# Patient Record
Sex: MALE | Race: WHITE
[De-identification: names, ages, dates, MRNs, and addresses within clinical notes are randomized per-mention and may not be internally consistent; named-entity substitution may affect disease eponyms.]

---

## 2018-01-25 ENCOUNTER — HOSPITAL ENCOUNTER (EMERGENCY)
Dept: HOSPITAL 31 - C.ER | Age: 73
Discharge: HOME | End: 2018-01-25
Payer: COMMERCIAL

## 2018-01-25 VITALS — SYSTOLIC BLOOD PRESSURE: 152 MMHG | RESPIRATION RATE: 18 BRPM | DIASTOLIC BLOOD PRESSURE: 82 MMHG | HEART RATE: 61 BPM

## 2018-01-25 VITALS — OXYGEN SATURATION: 99 % | TEMPERATURE: 97.6 F

## 2018-01-25 VITALS — BODY MASS INDEX: 26.6 KG/M2

## 2018-01-25 DIAGNOSIS — E11.9: ICD-10-CM

## 2018-01-25 DIAGNOSIS — R10.11: Primary | ICD-10-CM

## 2018-01-25 LAB
ALBUMIN SERPL-MCNC: 4 G/DL (ref 3.5–5)
ALBUMIN/GLOB SERPL: 1.3 {RATIO} (ref 1–2.1)
ALT SERPL-CCNC: 20 U/L (ref 21–72)
APTT BLD: 27 SECONDS (ref 21–34)
AST SERPL-CCNC: 16 U/L (ref 17–59)
BASOPHILS # BLD AUTO: 0 K/UL (ref 0–0.2)
BASOPHILS NFR BLD: 0.8 % (ref 0–2)
BILIRUB UR-MCNC: NEGATIVE MG/DL
BUN SERPL-MCNC: 17 MG/DL (ref 9–20)
CALCIUM SERPL-MCNC: 9.2 MG/DL (ref 8.6–10.4)
EOSINOPHIL # BLD AUTO: 0.1 K/UL (ref 0–0.7)
EOSINOPHIL NFR BLD: 2 % (ref 0–4)
ERYTHROCYTE [DISTWIDTH] IN BLOOD BY AUTOMATED COUNT: 12.4 % (ref 11.5–14.5)
GFR NON-AFRICAN AMERICAN: > 60
GLUCOSE UR STRIP-MCNC: NORMAL MG/DL
HGB BLD-MCNC: 13.1 G/DL (ref 12–18)
INR PPP: 1.1
LEUKOCYTE ESTERASE UR-ACNC: (no result) LEU/UL
LIPASE: 119 U/L (ref 23–300)
LYMPHOCYTES # BLD AUTO: 1.7 K/UL (ref 1–4.3)
LYMPHOCYTES NFR BLD AUTO: 28.1 % (ref 20–40)
MCH RBC QN AUTO: 34.2 PG (ref 27–31)
MCHC RBC AUTO-ENTMCNC: 34 G/DL (ref 33–37)
MCV RBC AUTO: 100.6 FL (ref 80–94)
MONOCYTES # BLD: 0.4 K/UL (ref 0–0.8)
MONOCYTES NFR BLD: 6.9 % (ref 0–10)
NEUTROPHILS # BLD: 3.8 K/UL (ref 1.8–7)
NEUTROPHILS NFR BLD AUTO: 62.2 % (ref 50–75)
NRBC BLD AUTO-RTO: 0.1 % (ref 0–2)
PH UR STRIP: 5 [PH] (ref 5–8)
PLATELET # BLD: 196 K/UL (ref 130–400)
PMV BLD AUTO: 9.1 FL (ref 7.2–11.7)
PROT UR STRIP-MCNC: NEGATIVE MG/DL
PROTHROMBIN TIME: 12.2 SECONDS (ref 9.7–12.2)
RBC # BLD AUTO: 3.83 MIL/UL (ref 4.4–5.9)
RBC # UR STRIP: NEGATIVE /UL
SP GR UR STRIP: 1.01 (ref 1–1.03)
URINE NITRATE: NEGATIVE
UROBILINOGEN UR-MCNC: NORMAL MG/DL (ref 0.2–1)
WBC # BLD AUTO: 6 K/UL (ref 4.8–10.8)

## 2018-01-25 PROCEDURE — 85025 COMPLETE CBC W/AUTO DIFF WBC: CPT

## 2018-01-25 PROCEDURE — 85610 PROTHROMBIN TIME: CPT

## 2018-01-25 PROCEDURE — 80053 COMPREHEN METABOLIC PANEL: CPT

## 2018-01-25 PROCEDURE — 81001 URINALYSIS AUTO W/SCOPE: CPT

## 2018-01-25 PROCEDURE — 99285 EMERGENCY DEPT VISIT HI MDM: CPT

## 2018-01-25 PROCEDURE — 96374 THER/PROPH/DIAG INJ IV PUSH: CPT

## 2018-01-25 PROCEDURE — 96375 TX/PRO/DX INJ NEW DRUG ADDON: CPT

## 2018-01-25 PROCEDURE — 76705 ECHO EXAM OF ABDOMEN: CPT

## 2018-01-25 PROCEDURE — 83690 ASSAY OF LIPASE: CPT

## 2018-01-25 PROCEDURE — 85730 THROMBOPLASTIN TIME PARTIAL: CPT

## 2018-01-25 PROCEDURE — 84484 ASSAY OF TROPONIN QUANT: CPT

## 2018-01-25 PROCEDURE — 71046 X-RAY EXAM CHEST 2 VIEWS: CPT

## 2018-01-25 PROCEDURE — 96361 HYDRATE IV INFUSION ADD-ON: CPT

## 2018-01-25 PROCEDURE — 93005 ELECTROCARDIOGRAM TRACING: CPT

## 2018-01-25 NOTE — RAD
HISTORY:

abd pain  



COMPARISON:

No prior.



TECHNIQUE:

Chest PA and lateral



FINDINGS:



LUNGS:

No active pulmonary disease.



PLEURA:

No significant pleural effusion identified. No pneumothorax apparent.



CARDIOVASCULAR:

Normal. Tortuous thoracic aorta 



OSSEOUS STRUCTURES:

No significant abnormalities.



VISUALIZED UPPER ABDOMEN:

Normal.



OTHER FINDINGS:

None.



IMPRESSION:

No active disease.

## 2018-01-25 NOTE — C.PDOC
History Of Present Illness


72 yo male come in for evaluation of cold sx associated with malaise, dry cough 

for past 2 weeks. Pt reports, developed RUQ pain for past few days. Otherwise, 

pt denies high fever, chills, headache, dizziness, drooling, dysphagia, dyspnea

, CP, SOB, palpitation, N/V/D, change in appetite, back pain, UTI sx. Ambulate 

to Ed for evaluation, not in any apparent distress.


Time Seen by Provider: 01/25/18 09:08


Chief Complaint (Nursing): Abdominal Pain


History Per: Patient





Past Medical History


Reviewed: Historical Data, Nursing Documentation, Vital Signs


Vital Signs: 


 Last Vital Signs











Temp  97.6 F   01/25/18 09:01


 


Pulse  55 L  01/25/18 09:01


 


Resp  20   01/25/18 09:01


 


BP  153/91 H  01/25/18 09:01


 


Pulse Ox  99   01/25/18 10:54














- Medical History


PMH: Diabetes (type 2)


Family History: States: Unknown Family Hx





- Social History


Hx Tobacco Use: No


Hx Alcohol Use: No


Hx Substance Use: No





Review Of Systems


Except As Marked, All Systems Reviewed And Found Negative.


Constitutional: Positive for: Malaise.  Negative for: Fever, Chills


ENT: Negative for: Ear Discharge, Nose Discharge, Mouth Swelling, Throat Pain, 

Throat Swelling


Cardiovascular: Negative for: Chest Pain, Palpitations, Edema, Light Headedness


Respiratory: Positive for: Cough.  Negative for: Shortness of Breath, Sputum, 

Wheezing


Gastrointestinal: Positive for: Abdominal Pain.  Negative for: Nausea, Vomiting

, Diarrhea, Melena, Hematochezia, Hematemesis


Genitourinary: Negative for: Dysuria, Incontinence


Musculoskeletal: Negative for: Neck Pain, Back Pain


Skin: Negative for: Rash


Neurological: Negative for: Weakness, Numbness, Altered Mental Status, Headache

, Dizziness





Physical Exam





- Physical Exam


Appears: Well, Non-toxic, No Acute Distress


Skin: Normal Color, Warm, Dry, No Rash


Eye(s): bilateral: PERRL


Nose: No Flaring, No Discharge


Oral Mucosa: Moist


Tongue: Normal Appearing


Lips: Normal Appearing


Throat: No Erythema, No Drooling


Neck: Trachea Midline, Supple


Cardiovascular: Rhythm Regular, No Murmur, No JVD, Other ((-) carotid bruits B/L

)


Respiratory: No Decreased Breath Sounds, No Accessory Muscle Use, No Stridor, 

No Wheezing


Gastrointestinal/Abdominal: Soft, Tenderness (mild RUQ tenderness), No 

Distention, No Guarding, No Rebound


Back: No CVA Tenderness


Extremity: Normal ROM, No Pedal Edema, No Deformity


Neurological/Psych: Oriented x3, Normal Speech





ED Course And Treatment





- Laboratory Results


Result Diagrams: 


 01/25/18 10:13





 01/25/18 10:13


Lab Interpretation: No Acute Changes


ECG: Interpreted By Me, Viewed By Me (and ED attending)


ECG Rhythm: Sinus Bradycardia


Interpretation Of ECG: Sinus bhupinder@49/min, NAD, T wave inversion in III, no 

acute ST-T changes.


O2 Sat by Pulse Oximetry: 99


Pulse Ox Interpretation: Normal





- Other Rad


  ** CXR


X-Ray: Read By Radiologist


Interpretation: FINDINGS:  LUNGS:  No active pulmonary disease.  PLEURA:  No 

significant pleural effusion identified. No pneumothorax apparent.  

CARDIOVASCULAR:  Normal. Tortuous thoracic aorta.  OSSEOUS STRUCTURES:  No 

significant abnormalities.  VISUALIZED UPPER ABDOMEN:  Normal.  OTHER FINDINGS:

  None.  IMPRESSION:  No active disease.





- CT Scan/US


  ** Gallbladder US


Other Rad Studies (CT/US): Radiology Report Reviewed


CT/US Interpretation: Right upper quadrant abdominal ultrasound.  History: 

Right upper quadrant abdominal pain.  Comparison: None available.  Technique: 

Real-time sonography was performed through the right upper quadrant of the 

abdomen.  Findings:  Liver: 13.7 centimeters in length. Increased echogenicity 

of the hepatic parenchymal cortex suggestive for fatty infiltration versus 

hepatic parenchymal disease. Clinical correlation.  Gallbladder: 

Cholelithiasis. Normal wall thickness of 2.4 millimeters.  Negative sonographic 

Schmid's sign.  Common bile duct measures 5.8 millimeters, within normal 

limits.  Visualized portions of the pancreas are preserved.  Pancreatic tail 

not well visualized.  Visualized aorta and IVC are preserved. Mild prominence 

of the proximal aorta measures up to 2.6 centimeters demonstrating some 

calcification and plaque.  Right kidney: 9.3 x 4.4 x 4.8 centimeters. Midpole 9 

millimeter echogenic foci suggestive for nonobstructive calculus. No 

hydronephrosis.  Impression:  Cholelithiasis. No gross gallbladder wall 

thickening. Negative sonographic Schmid's sign.  Increased echogenicity of the 

hepatic parenchymal cortex suggestive for fatty infiltration versus hepatic 

parenchymal disease.  Clinical correlation.  Limited visualization of the 

pancreas.


Progress Note: Pt was OBS in ED for 3.5 hours.  On re-eval, pt reports moderate 

improvement in his symptoms.  Afebrile, hemdoynamicalys table.  Non-toxic.  Pt 

was given PO challenfe, tolerated well.  Pt denies CP, SOB, headache, dizziness

, palpitation. PuslEOx 99% RA.  ENT: no acute findings.  neck: SUpple, (-) JVD, 

(-) carotid bruits B/L.  Lungs: CTA B/L, BS equal B/L.  CVS: (+)S1S2, reg, (-) 

murmur.  Abd: benign, (-) guarding, (-) rebound.  Back: (-) CVA tenderness.  

Neurologicaly intact.  Blood work review and appears without acute 

abnormalities.  CXR, EKG- normal study. US review and no emergent findings 

noted. Case discussed with , all test review and discharge with outpt f/

u recommend. Pt has clinical findings c/w abdominal pain, cough r/o viral 

illness.  Pt advised.  ref. to f/u with PMD in 2-3 days for re-eavl.  return to 

ED if any worsening or new changes.





Disposition


Counseled Patient/Family Regarding: Studies Performed, Diagnosis, Need For 

Followup, Rx Given





- Disposition


Disposition: HOME/ ROUTINE


Disposition Time: 12:01


Condition: STABLE


Additional Instructions: 


Follow up with PMD in 2-3 days for re-evaluation.


return to Ed if any worsening or new changes.


Instructions:  Abdominal Pain (ED)


Forms:  [facility] (English)


Print Language: Malaysian





- Clinical Impression


Clinical Impression: 


 Abdominal pain

## 2018-01-25 NOTE — US
Right upper quadrant abdominal ultrasound 



History: Right upper quadrant abdominal pain. 



Comparison: None available. 



Technique: Real-time sonography was performed through the right upper 

quadrant of the abdomen. 



Findings: 



Liver: 13.7 centimeters in length. Increased echogenicity of the 

hepatic parenchymal cortex suggestive for fatty infiltration versus 

hepatic parenchymal disease. Clinical correlation. 



Gallbladder: Cholelithiasis. Normal wall thickness of 2.4 

millimeters.  Negative sonographic Schmid's sign. 



Common bile duct measures 5.8 millimeters, within normal limits. 



Visualized portions of the pancreas are preserved.  Pancreatic tail 

not well visualized. 



Visualized aorta and IVC are preserved. Mild prominence of the 

proximal aorta measures up to 2.6 centimeters demonstrating some 

calcification and plaque. 



Right kidney: 9.3 x 4.4 x 4.8 centimeters. Midpole 9 millimeter 

echogenic foci suggestive for nonobstructive calculus. No 

hydronephrosis. 



Impression: 



Cholelithiasis. No gross gallbladder wall thickening. Negative 

sonographic Schmid's sign. 



Increased echogenicity of the hepatic parenchymal cortex suggestive 

for fatty infiltration versus hepatic parenchymal disease.  Clinical 

correlation. 



Limited visualization of the pancreas.

## 2018-01-25 NOTE — CARD
--------------- APPROVED REPORT --------------





EKG Measurement

Heart [id number]

ID [id number]

[id number]

[id number]

[id number]



<Conclusion>

Sinus bradycardia

Otherwise normal ECG

## 2019-04-16 ENCOUNTER — HOSPITAL ENCOUNTER (OUTPATIENT)
Dept: HOSPITAL 31 - C.ER | Age: 74
Setting detail: OBSERVATION
LOS: 3 days | Discharge: HOME | End: 2019-04-19
Payer: COMMERCIAL

## 2019-04-16 VITALS — BODY MASS INDEX: 31.2 KG/M2

## 2019-04-16 DIAGNOSIS — Z79.4: ICD-10-CM

## 2019-04-16 DIAGNOSIS — E11.65: ICD-10-CM

## 2019-04-16 DIAGNOSIS — E87.6: ICD-10-CM

## 2019-04-16 DIAGNOSIS — K21.9: ICD-10-CM

## 2019-04-16 DIAGNOSIS — K29.70: ICD-10-CM

## 2019-04-16 DIAGNOSIS — R07.89: Primary | ICD-10-CM

## 2019-04-16 LAB
ALBUMIN SERPL-MCNC: 4.7 G/DL (ref 3.5–5)
ALBUMIN/GLOB SERPL: 1.8 {RATIO} (ref 1–2.1)
ALT SERPL-CCNC: 10 U/L (ref 21–72)
AST SERPL-CCNC: 14 U/L (ref 17–59)
BASOPHILS # BLD AUTO: 0 K/UL (ref 0–0.2)
BASOPHILS NFR BLD: 0.5 % (ref 0–2)
BUN SERPL-MCNC: 19 MG/DL (ref 9–20)
CALCIUM SERPL-MCNC: 10.1 MG/DL (ref 8.6–10.4)
CK MB SERPL-MCNC: < 0.22 NG/ML (ref 0–3.38)
CK MB SERPL-MCNC: < 0.22 NG/ML (ref 0–3.38)
EOSINOPHIL # BLD AUTO: 0 K/UL (ref 0–0.7)
EOSINOPHIL NFR BLD: 0.3 % (ref 0–4)
ERYTHROCYTE [DISTWIDTH] IN BLOOD BY AUTOMATED COUNT: 12.2 % (ref 11.5–14.5)
GFR NON-AFRICAN AMERICAN: > 60
HGB BLD-MCNC: 14.4 G/DL (ref 12–18)
LIPASE: 148 U/L (ref 23–300)
LYMPHOCYTES # BLD AUTO: 1.3 K/UL (ref 1–4.3)
LYMPHOCYTES NFR BLD AUTO: 19.1 % (ref 20–40)
MCH RBC QN AUTO: 34.6 PG (ref 27–31)
MCHC RBC AUTO-ENTMCNC: 34.3 G/DL (ref 33–37)
MCV RBC AUTO: 101 FL (ref 80–94)
MONOCYTES # BLD: 0.3 K/UL (ref 0–0.8)
MONOCYTES NFR BLD: 5 % (ref 0–10)
NEUTROPHILS # BLD: 5.1 K/UL (ref 1.8–7)
NEUTROPHILS NFR BLD AUTO: 75.1 % (ref 50–75)
NRBC BLD AUTO-RTO: 0 % (ref 0–2)
PLATELET # BLD: 229 K/UL (ref 130–400)
PMV BLD AUTO: 9.4 FL (ref 7.2–11.7)
RBC # BLD AUTO: 4.14 MIL/UL (ref 4.4–5.9)
WBC # BLD AUTO: 6.7 K/UL (ref 4.8–10.8)

## 2019-04-16 PROCEDURE — 84484 ASSAY OF TROPONIN QUANT: CPT

## 2019-04-16 PROCEDURE — 82948 REAGENT STRIP/BLOOD GLUCOSE: CPT

## 2019-04-16 PROCEDURE — 83690 ASSAY OF LIPASE: CPT

## 2019-04-16 PROCEDURE — 85027 COMPLETE CBC AUTOMATED: CPT

## 2019-04-16 PROCEDURE — 80061 LIPID PANEL: CPT

## 2019-04-16 PROCEDURE — 80053 COMPREHEN METABOLIC PANEL: CPT

## 2019-04-16 PROCEDURE — 99285 EMERGENCY DEPT VISIT HI MDM: CPT

## 2019-04-16 PROCEDURE — 83036 HEMOGLOBIN GLYCOSYLATED A1C: CPT

## 2019-04-16 PROCEDURE — 96374 THER/PROPH/DIAG INJ IV PUSH: CPT

## 2019-04-16 PROCEDURE — 84443 ASSAY THYROID STIM HORMONE: CPT

## 2019-04-16 PROCEDURE — 83540 ASSAY OF IRON: CPT

## 2019-04-16 PROCEDURE — 83550 IRON BINDING TEST: CPT

## 2019-04-16 PROCEDURE — 97162 PT EVAL MOD COMPLEX 30 MIN: CPT

## 2019-04-16 PROCEDURE — 93005 ELECTROCARDIOGRAM TRACING: CPT

## 2019-04-16 PROCEDURE — 83880 ASSAY OF NATRIURETIC PEPTIDE: CPT

## 2019-04-16 PROCEDURE — 82746 ASSAY OF FOLIC ACID SERUM: CPT

## 2019-04-16 PROCEDURE — 97116 GAIT TRAINING THERAPY: CPT

## 2019-04-16 PROCEDURE — 71046 X-RAY EXAM CHEST 2 VIEWS: CPT

## 2019-04-16 PROCEDURE — 93306 TTE W/DOPPLER COMPLETE: CPT

## 2019-04-16 PROCEDURE — 36415 COLL VENOUS BLD VENIPUNCTURE: CPT

## 2019-04-16 PROCEDURE — 82607 VITAMIN B-12: CPT

## 2019-04-16 PROCEDURE — 97530 THERAPEUTIC ACTIVITIES: CPT

## 2019-04-16 PROCEDURE — 85025 COMPLETE CBC W/AUTO DIFF WBC: CPT

## 2019-04-16 PROCEDURE — 80048 BASIC METABOLIC PNL TOTAL CA: CPT

## 2019-04-16 NOTE — C.PDOC
History Of Present Illness


74 year old male brought in via transportation with complaint of new onset 

epigastric pain that began last night. Patient describes the pain as constant, 

localized, and waxing and waning. Patient's pain is not associated with eating 

or exertion. Patient has a history of occasional cough for 4 days. Patient 

denies history of CAD and GI disease. Patient has no PSHx. He denies nausea, 

vomiting, diarrhea, and SOB. 








VIA TRANS





NEW ONSET EPIG PAIN SINCE LAST NIGHT. CONSTANT LOCALIZED WAX/WANE NO ASSOC W 

EATING, EXERTION. DENIES NVD, SOB. HO OCC COUGH X 4 DAYS. NO FEVER. DENIES HO 

CAD, GI DISEASE. PSH NEG





EXAM


NONTOXIC


ANICTERIC MMM


ABD SOFT NT ND NO R/G


CTA B/L NO W/R/R


REMAINDER NEG


Time Seen by Provider: 19 11:47


Chief Complaint (Nursing): Chest Pain


History Per: Patient


History/Exam Limitations: no limitations


Onset/Duration Of Symptoms: Hrs (12), Waxing/Waning, Sudden Onset


Current Symptoms Are (Timing): Still Present


Quality: "Pain"


Associated Symptoms: denies: Nausea, Dyspnea


Modifying Factors: None


Exacerbating Factors: None


Alleviating Factors: None





Past Medical History


Reviewed: Historical Data, Nursing Documentation, Vital Signs


Vital Signs: 





                                Last Vital Signs











Temp  98.2 F   19 11:19


 


Pulse  62   19 11:19


 


Resp  20   19 11:19


 


BP  164/90 H  19 11:19


 


Pulse Ox  98   19 11:19














- Medical History


PMH: Diabetes (type 2)


Surgical History: No Surg Hx


Family History: States: Unknown Family Hx





- Social History


Hx Tobacco Use: No


Hx Alcohol Use: No


Hx Substance Use: No





Review Of Systems


Constitutional: Negative for: Fever, Chills


Cardiovascular: Negative for: Chest Pain


Respiratory: Positive for: Cough.  Negative for: Shortness of Breath


Gastrointestinal: Positive for: Abdominal Pain (epigastric pain).  Negative for:

Nausea, Vomiting, Diarrhea





Physical Exam





- Physical Exam


Appears: Well, Non-toxic, No Acute Distress


Skin: Normal Color, Warm, Dry


Head: Atraumatic, Normacephalic


Eye(s): bilateral: Normal Inspection (anicteric)


Oral Mucosa: Moist


Neck: Normal ROM, Supple


Chest: Symmetrical, No Deformity, No Tenderness


Cardiovascular: Rhythm Regular, No Murmur


Respiratory: No Accessory Muscle Use, No Rales, No Rhonchi, No Wheezing, Other 

(NARD)


Gastrointestinal/Abdominal: Soft, No Tenderness, No Distention, No Guarding, No 

Rebound


Extremity: Capillary Refill (<2 seconds)


Pulses: Left Radial: Normal, Right Radial: Normal


Neurological/Psych: Oriented x3, Normal Speech, Normal Cognition





ED Course And Treatment





- Laboratory Results


Result Diagrams: 


                                 19 11:49





                                 19 11:49


ECG: Interpreted By Me


ECG Rhythm: Sinus Rhythm


ECG Interpretation: Normal


Rate From EC


O2 Sat by Pulse Oximetry: 98 (in RA)


Pulse Ox Interpretation: Normal





- Radiology


CXR: Interpreted by Me


CXR Interpretation: Yes: No Acute Disease


Progress Note: EKG and CXR ordered for patient.  Labs ordered with CMP, CBC, 

lipase.





Progress





- Re-Evaluation


Re-evaluation Note: 





19 12:27


APPEARS COMFORTABLE VSS. PENDING CALLBACK MED ON CALL


19 12:48


D/W DR HUYNH MED ON CALL WILL ADMIT





- Data Reviewed


Data Reviewed: Lab, Diagnostic imaging, EKG, Old records





Disposition


Counseled Patient/Family Regarding: Studies Performed, Diagnosis





- Disposition


Disposition: HOSPITALIZED


Disposition Time: 12:49


Condition: STABLE


Forms:  [facility] (English)





- POA


Present On Arrival: None, Poor Glycemic Control





- Clinical Impression


Clinical Impression: 


 Chest pain








- Scribe Statement


The provider has reviewed the documentation as recorded by the Scribe (Ericka Leon)








All medical record entries made by the Scribe were at my direction and 

personally dictated by me. I have reviewed the chart and agree that the record 

accurately reflects my personal performance of the history, physical exam, 

medical decision making, and the department course for this patient. I have also

personally directed, reviewed, and agree with the discharge instructions and 

disposition.

## 2019-04-16 NOTE — RAD
Chest x-ray two views 



HISTORY:

Chest pain. 



COMPARISON:

01/25/2018 



FINDINGS:

Blunted left costophrenic angle which may represent pleural 

thickening and or trace effusion. 



Mild patchy increased markings at the left lung base. 



Tortuous aorta. 



Heart size within normal limits. 



Degenerative changes in the spine. 



Impression: 



Blunted left costophrenic angle which may represent pleural 

thickening and or trace effusion. 



Mild patchy increased markings at the left lung base. 



Tortuous aorta.

## 2019-04-17 LAB
ALBUMIN SERPL-MCNC: 3.9 G/DL (ref 3.5–5)
ALBUMIN/GLOB SERPL: 1.6 {RATIO} (ref 1–2.1)
ALT SERPL-CCNC: 16 U/L (ref 21–72)
AST SERPL-CCNC: 13 U/L (ref 17–59)
BASOPHILS # BLD AUTO: 0 K/UL (ref 0–0.2)
BASOPHILS NFR BLD: 0.6 % (ref 0–2)
BUN SERPL-MCNC: 17 MG/DL (ref 9–20)
CALCIUM SERPL-MCNC: 9.4 MG/DL (ref 8.6–10.4)
CK MB SERPL-MCNC: < 0.22 NG/ML (ref 0–3.38)
CK MB SERPL-MCNC: < 0.22 NG/ML (ref 0–3.38)
EOSINOPHIL # BLD AUTO: 0.1 K/UL (ref 0–0.7)
EOSINOPHIL NFR BLD: 1 % (ref 0–4)
ERYTHROCYTE [DISTWIDTH] IN BLOOD BY AUTOMATED COUNT: 12.1 % (ref 11.5–14.5)
GFR NON-AFRICAN AMERICAN: > 60
HGB BLD-MCNC: 13.5 G/DL (ref 12–18)
LYMPHOCYTES # BLD AUTO: 1.4 K/UL (ref 1–4.3)
LYMPHOCYTES NFR BLD AUTO: 21.8 % (ref 20–40)
MCH RBC QN AUTO: 33.2 PG (ref 27–31)
MCHC RBC AUTO-ENTMCNC: 33.4 G/DL (ref 33–37)
MCV RBC AUTO: 99.6 FL (ref 80–94)
MONOCYTES # BLD: 0.5 K/UL (ref 0–0.8)
MONOCYTES NFR BLD: 7 % (ref 0–10)
NEUTROPHILS # BLD: 4.5 K/UL (ref 1.8–7)
NEUTROPHILS NFR BLD AUTO: 69.6 % (ref 50–75)
NRBC BLD AUTO-RTO: 0 % (ref 0–2)
PLATELET # BLD: 205 K/UL (ref 130–400)
PMV BLD AUTO: 9.3 FL (ref 7.2–11.7)
RBC # BLD AUTO: 4.06 MIL/UL (ref 4.4–5.9)
WBC # BLD AUTO: 6.5 K/UL (ref 4.8–10.8)

## 2019-04-17 RX ADMIN — ENOXAPARIN SODIUM SCH MG: 40 INJECTION SUBCUTANEOUS at 11:15

## 2019-04-17 RX ADMIN — HUMAN INSULIN SCH: 100 INJECTION, SOLUTION SUBCUTANEOUS at 23:21

## 2019-04-17 RX ADMIN — HUMAN INSULIN SCH UNITS: 100 INJECTION, SOLUTION SUBCUTANEOUS at 17:29

## 2019-04-18 VITALS — RESPIRATION RATE: 20 BRPM

## 2019-04-18 LAB
% IRON SATURATION: 44 (ref 20–55)
BNP SERPL-MCNC: 39.6 PG/ML (ref 0–900)
BUN SERPL-MCNC: 19 MG/DL (ref 9–20)
CALCIUM SERPL-MCNC: 9.1 MG/DL (ref 8.6–10.4)
ERYTHROCYTE [DISTWIDTH] IN BLOOD BY AUTOMATED COUNT: 12 % (ref 11.5–14.5)
FOLATE SERPL-MCNC: 15.6 NG/ML
GFR NON-AFRICAN AMERICAN: > 60
HDLC SERPL-MCNC: 42 MG/DL (ref 30–70)
HGB BLD-MCNC: 13.7 G/DL (ref 12–18)
IRON SERPL-MCNC: 132 UG/DL (ref 49–181)
LDLC SERPL-MCNC: 93 MG/DL (ref 0–129)
MCH RBC QN AUTO: 33.7 PG (ref 27–31)
MCHC RBC AUTO-ENTMCNC: 33.5 G/DL (ref 33–37)
MCV RBC AUTO: 100.6 FL (ref 80–94)
PLATELET # BLD: 220 K/UL (ref 130–400)
PMV BLD AUTO: 9.3 FL (ref 7.2–11.7)
RBC # BLD AUTO: 4.08 MIL/UL (ref 4.4–5.9)
TIBC SERPL-MCNC: 302 UG/DL (ref 250–450)
VIT B12 SERPL-MCNC: 241 PG/ML (ref 239–931)
WBC # BLD AUTO: 7.4 K/UL (ref 4.8–10.8)

## 2019-04-18 RX ADMIN — HUMAN INSULIN SCH UNITS: 100 INJECTION, SOLUTION SUBCUTANEOUS at 12:00

## 2019-04-18 RX ADMIN — ENOXAPARIN SODIUM SCH MG: 40 INJECTION SUBCUTANEOUS at 09:06

## 2019-04-18 RX ADMIN — HUMAN INSULIN SCH UNITS: 100 INJECTION, SOLUTION SUBCUTANEOUS at 17:45

## 2019-04-18 RX ADMIN — HUMAN INSULIN SCH: 100 INJECTION, SOLUTION SUBCUTANEOUS at 21:40

## 2019-04-18 RX ADMIN — HUMAN INSULIN SCH: 100 INJECTION, SOLUTION SUBCUTANEOUS at 08:49

## 2019-04-18 NOTE — CARD
--------------- APPROVED REPORT --------------





Date of service: 04/17/2019



EXAM: Two-dimensional and M-mode echocardiogram with Doppler and 

color Doppler.



Other Information 

Quality : GoodRhythm : 



INDICATION

Chest Pain 



RISK FACTORS

Diabetes



2D DIMENSIONS 

IVSd1.1   (0.7-1.1cm)LVDd4.2   (3.9-5.9cm)

LVOT Diameter2.3   (1.8-2.4cm)PWd1.1   (0.7-1.1cm)

LA Bpxkgd67   (18-58mL)LVDs2.3   (2.5-4.0cm)

FS (%) 45.3   %LVEF (%)75.0   (>50%)

LVEF (Paez's)70 %



M-Mode DIMENSIONS 

Left Atrium (MM)2.52   (2.5-4.0cm)IVSd1.22   (0.7-1.1cm)

Aortic Root4.01   (2.2-3.7cm)LVDd5.51   (4.0-5.6cm)

Aortic Cusp Exc.2.13   (1.5-2.0cm)PWd1.22   (0.7-1.1cm)

FS (%) 34   %LVDs3.61   (2.0-3.8cm)

LVEF (%)65   (>50%)



Aortic Valve

AoV Peak Glgqwgan042.5cm/sAoV VTI47.2cmAO Peak GR.25mmHg

LVOT Peak Ipajjkzw97.3cm/sLVOT VTI20.23cmAO Mean GR.14mmHg

JULES (VMAX)1.03my8EHR (VTI)1.30ia9QA P 1/2 Gudr332dy



Mitral Valve

MV E Yiovgtax45.8cm/sMV A Jkdxxwrq50.0cm/sE/A ratio0.6



TDI

Lateral E' Peak V7.06cm/sMedial E' Peak V4.99cm/sE/Lateral E'8.2

E/Medial E'11.6



Tricuspid Valve

TR Peak Xbtcixbf434iy/sTR Peak Gr.47pnMoMXMN34pmNq



<Conclusion>

Left ventricle: thickness: normal; size: normal; overall ejection 

fraction: 65%: 

diastolic filling pressures: normal



Mitral valve: annulus: normal: leaflets: normal: excursion: normal; 

no significant trans-mitral gradient: No significant incompetence: 

left atrium: normal

Aortic valve: leaflets:thickened;  excursion: normal; 28/16mmHg 

trans-aortic gradient:trace  incompetence: aortic root: dilated; AV 

area 1.6cm2

Right sided Structures: Pulmonary valve: normal; no significant 

incompetence; Tricuspid valve: normal; no significant incompetence:

Intra-cardiac hemodynamics: pulmonary systolic pressures: normal; 

central venous pressures: normal

No pericardial effusion

## 2019-04-18 NOTE — HP
The patient is a 74-year-old male.  The patient was seen and examined at

the bedside on 04/17/2019.



CHIEF COMPLAINT:  Chest pain.



HISTORY OF PRESENT ILLNESS:  Mr. Reji Love is a 74-year-old male

brought via transportation with complaining of new onset of chest pain,

epigastric pain that began last night.  The patient described the pain as

constant, localized, waxing and waning.  The patient's pain is not

associated with eating or exertion.  The patient has history of occasional

cough for two days.  The patient denies history of coronary artery disease

and GI.  The patient has no past medical history.  Denies nausea, vomiting,

diarrhea, or shortness of breath.



PAST MEDICAL HISTORY:  Denies except for diabetes mellitus.



FAMILY HISTORY:  Father and mother, noncontributory.



HABITS:  Never smoked.  No drugs.  No ethanol.



REVIEW OF SYSTEMS:  The patient was seen and examined at the bedside,

looking comfortable.  No fever.  No chills.  No hematuria.  No

hematochezia.  No headache.  No dizziness.  No chest pain.  No palpitation

PHYSICAL EXAMINATION:

VITAL SIGNS:  Temperature 98.3, pulse 62, respiratory rate 20, blood

pressure 164/90, and pulse oximetry 98.

HEENT:  Head; atraumatic and normocephalic.  Eyes; PERRLA, extraocular

muscles intact, conjunctivae clear.  Nose is patent.  Mucous membrane

moist.

NECK:  Supple.  No carotid bruit.  No JVD or thyromegaly.

CHEST:  Bilaterally symmetrical.

HEART:  S1 and S2 positive.

LUNGS:  Clear to auscultation.

ABDOMEN:  Soft.  Bowel sounds present.  No organomegaly.

EXTREMITIES:  No edema.  No cyanosis.

NEUROLOGICAL:  The patient is awake and alert.  Moving all four

extremities.  No focal deficits.



LABORATORY DATA:  White blood cell 6.7, hemoglobin 14.4, hematocrit 41.9,

and platelets 229.  Sodium 133, potassium 4.1, BUN 19, creatinine 1.1,

glucose 225.



ASSESSMENT AND PLAN:  Mr. Reji Love is a 74-year-old male with

hypokalemia, hyperglycemia, history of diabetes mellitus who came with

chest pain.  We tested the patient with electrocardiography, cardiac

enzymes x2 less than 0.0120, diabetes uncontrolled, hemoglobin A1c is 9.2. 

Need diabetic teaching classes with Cardiology consult.  Repeat labs. 

Observe the patient.  We will follow.





__________________________________________

Ondina Jaramillo MD





__________________________________________

 (Delete this signature block when dictator is a preceptor.)



cc:  Name, MD (Delete if not dictated.)



DD:  04/17/2019 23:03:15

DT:  04/18/2019 4:05:06

Job # [id number]

MTDELEANOR

## 2019-04-18 NOTE — CARD
--------------- APPROVED REPORT --------------





Date of service: 04/16/2019



EKG Measurement

Heart [id number]

ME [id number]

[id number]

[id number]

[id number]



<Conclusion>

Normal sinus rhythm

Normal ECG

## 2019-04-18 NOTE — CARD
--------------- APPROVED REPORT --------------





Date of service: 04/16/2019



EKG Measurement

Heart [id number]

AK [id number]

[id number]

[id number]

[id number]



<Conclusion>

Normal sinus rhythm

Normal ECG

## 2019-04-19 VITALS — DIASTOLIC BLOOD PRESSURE: 76 MMHG | SYSTOLIC BLOOD PRESSURE: 116 MMHG | HEART RATE: 74 BPM

## 2019-04-19 VITALS — TEMPERATURE: 97.9 F | OXYGEN SATURATION: 96 %

## 2019-04-19 LAB
ALBUMIN SERPL-MCNC: 3.9 G/DL (ref 3.5–5)
ALBUMIN/GLOB SERPL: 1.6 {RATIO} (ref 1–2.1)
ALT SERPL-CCNC: 19 U/L (ref 21–72)
AST SERPL-CCNC: 21 U/L (ref 17–59)
BASOPHILS # BLD AUTO: 0 K/UL (ref 0–0.2)
BASOPHILS NFR BLD: 0.6 % (ref 0–2)
BUN SERPL-MCNC: 18 MG/DL (ref 9–20)
CALCIUM SERPL-MCNC: 8.8 MG/DL (ref 8.6–10.4)
EOSINOPHIL # BLD AUTO: 0.1 K/UL (ref 0–0.7)
EOSINOPHIL NFR BLD: 2.1 % (ref 0–4)
ERYTHROCYTE [DISTWIDTH] IN BLOOD BY AUTOMATED COUNT: 11.9 % (ref 11.5–14.5)
GFR NON-AFRICAN AMERICAN: > 60
HGB BLD-MCNC: 13.7 G/DL (ref 12–18)
LYMPHOCYTES # BLD AUTO: 1.6 K/UL (ref 1–4.3)
LYMPHOCYTES NFR BLD AUTO: 26.1 % (ref 20–40)
MCH RBC QN AUTO: 34.2 PG (ref 27–31)
MCHC RBC AUTO-ENTMCNC: 34.2 G/DL (ref 33–37)
MCV RBC AUTO: 99.9 FL (ref 80–94)
MONOCYTES # BLD: 0.4 K/UL (ref 0–0.8)
MONOCYTES NFR BLD: 7.2 % (ref 0–10)
NEUTROPHILS # BLD: 3.9 K/UL (ref 1.8–7)
NEUTROPHILS NFR BLD AUTO: 64 % (ref 50–75)
NRBC BLD AUTO-RTO: 0 % (ref 0–2)
PLATELET # BLD: 224 K/UL (ref 130–400)
PMV BLD AUTO: 9.3 FL (ref 7.2–11.7)
RBC # BLD AUTO: 4.01 MIL/UL (ref 4.4–5.9)
WBC # BLD AUTO: 6.1 K/UL (ref 4.8–10.8)

## 2019-04-19 RX ADMIN — HUMAN INSULIN SCH UNITS: 100 INJECTION, SOLUTION SUBCUTANEOUS at 08:45

## 2019-04-19 RX ADMIN — ENOXAPARIN SODIUM SCH MG: 40 INJECTION SUBCUTANEOUS at 10:18

## 2019-04-19 RX ADMIN — HUMAN INSULIN SCH UNITS: 100 INJECTION, SOLUTION SUBCUTANEOUS at 13:31

## 2019-04-19 NOTE — CP.PCM.CON
History of Present Illness





- History of Present Illness


History of Present Illness: 





74 year old male brought in via transportation with complaint of new onset 

epigastric pain that began last night. Patient describes the pain as constant, 

localized, and waxing and waning. Patient's pain is not associated with eating 

or exertion. Patient has a history of occasional cough for 4 days. Patient 

denies history of CAD and GI disease. Patient has history of diabetes and has 

been taking Metformin for few years. History was obtained with the help of 

.








Review of Systems





- Constitutional


Constitutional: As Per HPI





- EENT


Eyes: As Per HPI, Decreased Night Vision





- Cardiovascular


Cardiovascular: As Per HPI





- Gastrointestinal


Gastrointestinal: Abdominal Pain





- Neurological


Neurological: As Per HPI





Past Patient History





- Past Medical History & Family History


Past Medical History?: Yes





- Past Social History


Smoking Status: Never Smoked





- CARDIAC


Hx Cardiac Disorders: No





- PULMONARY


Hx Respiratory Disorders: No





- NEUROLOGICAL


Hx Dementia: No





- ENDOCRINE/METABOLIC


Hx Diabetes Mellitus Type 1: Yes


Hx Diabetes Mellitus Type 2: Yes





- PSYCHIATRIC


Hx Substance Use: No





- SURGICAL HISTORY


Hx Surgeries: No





- ANESTHESIA


Hx Anesthesia: No





Meds


Allergies/Adverse Reactions: 


                                    Allergies











Allergy/AdvReac Type Severity Reaction Status Date / Time


 


No Known Allergies Allergy   Verified 01/25/18 09:00














- Medications


Medications: 


                               Current Medications





Aspirin (Aspirin Chewable)  81 mg PO DAILY [facility]


   Last Admin: 04/19/19 10:17 Dose:  81 mg


Enoxaparin Sodium (Lovenox)  40 mg SC DAILY [facility]


   Last Admin: 04/19/19 10:18 Dose:  40 mg


Famotidine (Pepcid)  20 mg PO DAILY [facility]


   Last Admin: 04/19/19 10:17 Dose:  20 mg


Insulin Human Regular (Novolin R)  0 unit SC [facility]; Protocol


   Last Admin: 04/19/19 08:45 Dose:  1 units


Metformin HCl (Glucophage)  850 mg PO BID [facility]


   Last Admin: 04/19/19 10:17 Dose:  850 mg











Physical Exam





- Head Exam


Head Exam: NORMOCEPHALIC





- Neck Exam


Neck exam: Positive for: Normal Inspection





- Respiratory Exam


Respiratory Exam: NORMAL BREATHING PATTERN





- Cardiovascular Exam


Cardiovascular Exam: REGULAR RHYTHM





- GI/Abdominal Exam


GI & Abdominal Exam: Normal Bowel Sounds





- Extremities Exam


Extremities exam: Positive for: normal inspection





- Neurological Exam


Neurological exam: Altered, Oriented x3





Results





- Vital Signs


Recent Vital Signs: 


                                Last Vital Signs











Temp  97.9 F   04/19/19 07:05


 


Pulse  62   04/19/19 07:05


 


Resp  20   04/19/19 07:05


 


BP  120/81   04/19/19 07:05


 


Pulse Ox  96   04/19/19 07:05














- Labs


Result Diagrams: 


                                 04/19/19 08:19





                                 04/19/19 08:19


Labs: 


                         Laboratory Results - last 24 hr











  04/18/19 04/18/19 04/19/19





  16:24 20:59 06:25


 


WBC   


 


RBC   


 


Hgb   


 


Hct   


 


MCV   


 


MCH   


 


MCHC   


 


RDW   


 


Plt Count   


 


MPV   


 


Neut % (Auto)   


 


Lymph % (Auto)   


 


Mono % (Auto)   


 


Eos % (Auto)   


 


Baso % (Auto)   


 


Neut # (Auto)   


 


Lymph # (Auto)   


 


Mono # (Auto)   


 


Eos # (Auto)   


 


Baso # (Auto)   


 


Sodium   


 


Potassium   


 


Chloride   


 


Carbon Dioxide   


 


Anion Gap   


 


BUN   


 


Creatinine   


 


Est GFR ( Amer)   


 


Est GFR (Non-Af Amer)   


 


POC Glucose (mg/dL)  187 H  144 H  195 H


 


Random Glucose   


 


Calcium   


 


Total Bilirubin   


 


AST   


 


ALT   


 


Alkaline Phosphatase   


 


Total Protein   


 


Albumin   


 


Globulin   


 


Albumin/Globulin Ratio   














  04/19/19 04/19/19 04/19/19





  08:19 08:19 11:08


 


WBC  6.1  


 


RBC  4.01 L  


 


Hgb  13.7  


 


Hct  40.1  


 


MCV  99.9 H  


 


MCH  34.2 H  


 


MCHC  34.2  


 


RDW  11.9  


 


Plt Count  224  


 


MPV  9.3  


 


Neut % (Auto)  64.0  


 


Lymph % (Auto)  26.1  


 


Mono % (Auto)  7.2  


 


Eos % (Auto)  2.1  


 


Baso % (Auto)  0.6  


 


Neut # (Auto)  3.9  


 


Lymph # (Auto)  1.6  


 


Mono # (Auto)  0.4  


 


Eos # (Auto)  0.1  


 


Baso # (Auto)  0.0  


 


Sodium   131 L 


 


Potassium   4.2 


 


Chloride   97 L 


 


Carbon Dioxide   27 


 


Anion Gap   11 


 


BUN   18 


 


Creatinine   1.0 


 


Est GFR ( Amer)   > 60 


 


Est GFR (Non-Af Amer)   > 60 


 


POC Glucose (mg/dL)    256 H


 


Random Glucose   174 H 


 


Calcium   8.8 


 


Total Bilirubin   0.8 


 


AST   21 


 


ALT   19 L 


 


Alkaline Phosphatase   60 


 


Total Protein   6.3 


 


Albumin   3.9 


 


Globulin   2.4 


 


Albumin/Globulin Ratio   1.6 














- Impressions


Impression: 





Sinus rhythm with non-specific ST-T changes.





Assessment & Plan


(1) Chest pain


Assessment and Plan: 


Chest pain is atypical and has relived with Antacids. Cardiac work-up has been 

negative so far. However there are multiple risks of CAD and further work-up is 

needed. This can be done as out patient. Discussed with patient with the help of

. Patient understands and will follow as out patient. Case discussed 

with team. May D/C home of there are no new issues.


Status: Acute   





(2) Abdominal pain


Assessment and Plan: 


Most likely Gastritis, Continue Pepcid. 


Status: Acute

## 2019-04-19 NOTE — PN
DATE:  04/18/2019



SUBJECTIVE:  The patient is a 74-year-old male.  The patient was seen and

examined at the bedside on 04/18/2019, looking comfortable.  No fever.  No

chills.  No hematuria.  No hematochezia.  No headache.  No dizziness.  No

chest pain.  No palpitation.



PHYSICAL EXAMINATION:

VITAL SIGNS:  Temperature 98.3, pulse of 72, blood pressure 123/83, and

respiratory rate 20.

HEENT:  Head:  Normocephalic and atraumatic.  Eyes:  PERRLA.  Extraocular

muscles are intact.  Conjunctivae are clear.  Nose is patent.  Mucous

membranes are moist.

NECK:  Supple.  No carotid bruits.  No JVD or thyromegaly.

CHEST:  Bilaterally symmetrical.

HEART:  S1 and S2 positive.

LUNGS:  Clear to auscultation.

ABDOMEN:  Soft.  Bowel sounds present.  No organomegaly.

EXTREMITIES:  No edema.  No cyanosis.

NEUROLOGIC:  The patient awake and alert.  Moving all four extremities.  No

focal deficits.



MEDICATIONS:  Aspirin, metformin, Lovenox, Novolin, Pepcid, Protonix.



LABORATORY DATA:  White blood cell 7.4, hemoglobin 13.7, hematocrit 41.1,

and platelets 220.  Sodium 135, potassium 4, BUN 90, creatinine 0.9,

glucose 254.



ASSESSMENT AND PLAN:  Mr. Reji Love is a 74-year-old male with

hyperglycemia, uncontrolled diabetes mellitus, hemoglobin A1c is 9.3, B12

deficiency, who got hyperthyroidism, came with chest pain, history of

diabetes mellitus, gastroesophageal reflux disease, and dyspepsia.  The

patient has an echocardiography done.  Cardiac enzymes are negative.  Needs

diabetic teaching.  Awaiting input from the cardiologist.  The patient's

cardiologist is Dr. Shari Zamora.  Repeat labs.  We will follow up.







__________________________________________

Ondina Jaramillo MD





DD:  04/18/2019 23:32:34

DT:  04/19/2019 1:53:13

Job # [id number]

## 2019-04-19 NOTE — CARD
--------------- APPROVED REPORT --------------





Date of service: 04/17/2019



EKG Measurement

Heart [id number]

AR [id number]

[id number]

[id number]

[id number]



<Conclusion>

Normal sinus rhythm

Normal ECG

## 2019-04-22 ENCOUNTER — HOSPITAL ENCOUNTER (OUTPATIENT)
Dept: HOSPITAL 31 - C.ER | Age: 74
Setting detail: OBSERVATION
LOS: 1 days | Discharge: HOME | End: 2019-04-23
Attending: INTERNAL MEDICINE | Admitting: INTERNAL MEDICINE
Payer: COMMERCIAL

## 2019-04-22 VITALS — BODY MASS INDEX: 31.2 KG/M2

## 2019-04-22 VITALS — TEMPERATURE: 97.9 F

## 2019-04-22 DIAGNOSIS — E11.9: ICD-10-CM

## 2019-04-22 DIAGNOSIS — R10.13: Primary | ICD-10-CM

## 2019-04-22 DIAGNOSIS — Z79.84: ICD-10-CM

## 2019-04-22 DIAGNOSIS — K59.00: ICD-10-CM

## 2019-04-22 DIAGNOSIS — K57.90: ICD-10-CM

## 2019-04-22 DIAGNOSIS — N20.0: ICD-10-CM

## 2019-04-22 DIAGNOSIS — R11.10: ICD-10-CM

## 2019-04-22 LAB
ALBUMIN SERPL-MCNC: 3.9 G/DL (ref 3.5–5)
ALBUMIN/GLOB SERPL: 1.6 {RATIO} (ref 1–2.1)
ALT SERPL-CCNC: 27 U/L (ref 21–72)
APTT BLD: 33 SECONDS (ref 21–34)
AST SERPL-CCNC: 27 U/L (ref 17–59)
BASE EXCESS BLDV CALC-SCNC: 3.4 MMOL/L (ref 0–2)
BASOPHILS # BLD AUTO: 0 K/UL (ref 0–0.2)
BASOPHILS NFR BLD: 0.5 % (ref 0–2)
BILIRUB UR-MCNC: NEGATIVE MG/DL
BUN SERPL-MCNC: 10 MG/DL (ref 9–20)
CALCIUM SERPL-MCNC: 9.3 MG/DL (ref 8.6–10.4)
CK MB SERPL-MCNC: < 0.22 NG/ML (ref 0–3.38)
EOSINOPHIL # BLD AUTO: 0.1 K/UL (ref 0–0.7)
EOSINOPHIL NFR BLD: 1.1 % (ref 0–4)
ERYTHROCYTE [DISTWIDTH] IN BLOOD BY AUTOMATED COUNT: 12.3 % (ref 11.5–14.5)
GFR NON-AFRICAN AMERICAN: > 60
GLUCOSE UR STRIP-MCNC: (no result) MG/DL
HDLC SERPL-MCNC: 44 MG/DL (ref 30–70)
HGB BLD-MCNC: 13.3 G/DL (ref 12–18)
INR PPP: 1.1
LDLC SERPL-MCNC: 85 MG/DL (ref 0–129)
LEUKOCYTE ESTERASE UR-ACNC: (no result) LEU/UL
LIPASE: 125 U/L (ref 23–300)
LYMPHOCYTES # BLD AUTO: 1.7 K/UL (ref 1–4.3)
LYMPHOCYTES NFR BLD AUTO: 25.4 % (ref 20–40)
MCH RBC QN AUTO: 33.7 PG (ref 27–31)
MCHC RBC AUTO-ENTMCNC: 33.4 G/DL (ref 33–37)
MCV RBC AUTO: 100.8 FL (ref 80–94)
MONOCYTES # BLD: 0.5 K/UL (ref 0–0.8)
MONOCYTES NFR BLD: 6.7 % (ref 0–10)
NEUTROPHILS # BLD: 4.5 K/UL (ref 1.8–7)
NEUTROPHILS NFR BLD AUTO: 66.3 % (ref 50–75)
NRBC BLD AUTO-RTO: 0 % (ref 0–2)
PCO2 BLDV: 50 MMHG (ref 40–60)
PH BLDV: 7.38 [PH] (ref 7.32–7.43)
PH UR STRIP: 6 [PH] (ref 5–8)
PLATELET # BLD: 231 K/UL (ref 130–400)
PMV BLD AUTO: 8.9 FL (ref 7.2–11.7)
PROT UR STRIP-MCNC: NEGATIVE MG/DL
PROTHROMBIN TIME: 12.4 SECONDS (ref 9.7–12.2)
RBC # BLD AUTO: 3.94 MIL/UL (ref 4.4–5.9)
RBC # UR STRIP: NEGATIVE /UL
SP GR UR STRIP: 1.02 (ref 1–1.03)
UROBILINOGEN UR-MCNC: NORMAL MG/DL (ref 0.2–1)
VENOUS BLOOD GAS PO2: 30 MM/HG (ref 30–55)
WBC # BLD AUTO: 6.8 K/UL (ref 4.8–10.8)

## 2019-04-22 PROCEDURE — 90732 PPSV23 VACC 2 YRS+ SUBQ/IM: CPT

## 2019-04-22 PROCEDURE — 83690 ASSAY OF LIPASE: CPT

## 2019-04-22 PROCEDURE — 81001 URINALYSIS AUTO W/SCOPE: CPT

## 2019-04-22 PROCEDURE — 82803 BLOOD GASES ANY COMBINATION: CPT

## 2019-04-22 PROCEDURE — 82948 REAGENT STRIP/BLOOD GLUCOSE: CPT

## 2019-04-22 PROCEDURE — 84100 ASSAY OF PHOSPHORUS: CPT

## 2019-04-22 PROCEDURE — 85730 THROMBOPLASTIN TIME PARTIAL: CPT

## 2019-04-22 PROCEDURE — 85025 COMPLETE CBC W/AUTO DIFF WBC: CPT

## 2019-04-22 PROCEDURE — 96374 THER/PROPH/DIAG INJ IV PUSH: CPT

## 2019-04-22 PROCEDURE — 74177 CT ABD & PELVIS W/CONTRAST: CPT

## 2019-04-22 PROCEDURE — 80061 LIPID PANEL: CPT

## 2019-04-22 PROCEDURE — 85610 PROTHROMBIN TIME: CPT

## 2019-04-22 PROCEDURE — 83735 ASSAY OF MAGNESIUM: CPT

## 2019-04-22 PROCEDURE — 84484 ASSAY OF TROPONIN QUANT: CPT

## 2019-04-22 PROCEDURE — 36415 COLL VENOUS BLD VENIPUNCTURE: CPT

## 2019-04-22 PROCEDURE — 90471 IMMUNIZATION ADMIN: CPT

## 2019-04-22 PROCEDURE — 76705 ECHO EXAM OF ABDOMEN: CPT

## 2019-04-22 PROCEDURE — 83036 HEMOGLOBIN GLYCOSYLATED A1C: CPT

## 2019-04-22 PROCEDURE — 99285 EMERGENCY DEPT VISIT HI MDM: CPT

## 2019-04-22 PROCEDURE — 96360 HYDRATION IV INFUSION INIT: CPT

## 2019-04-22 PROCEDURE — 80053 COMPREHEN METABOLIC PANEL: CPT

## 2019-04-22 RX ADMIN — HUMAN INSULIN SCH: 100 INJECTION, SOLUTION SUBCUTANEOUS at 21:15

## 2019-04-22 NOTE — CP.PCM.HP
History of Present Illness





- History of Present Illness


History of Present Illness: 





74 year old Brian Green party male with history of type 2 diabetes presents 

to the ED today complaining of epigastric abdominal pain. Patient reports his 

abdominal pain started about 10 days ago. He describes the pain as burning in 

sensation, non radiating, and rating it 7/10 at the worst. He states the pain is

worse with food intake and improves with rest. He also reports to have 

associated nausea and vomiting. No prior history of the same. Patient was 

diagnosed with diabetes 10 years ago in  and was told to take Meformin 850mg 

twice daily. Patient denies recent change of diet, fever, chills, headache, 

shortness of breath, chest pain, diarrhea, constipation, or urinary complaints.





 ID: [id number]





Daughter Titi: [phone]


PMHx: DM


PSHx: none


Allergy: none


social Hx: denies alcohol, tobacco, or other drug use


Family Hx: Mother with DM


Medication: Metformin 850mg BID from 





Present on Admission





- Present on Admission


Any Indicators Present on Admission: No





Review of Systems





- Constitutional


Constitutional: As Per HPI.  absent: Chills, Fever, Headache





- EENT


Eyes: As Per HPI.  absent: Blind Spots, Decreased Night Vision, Diplopia


Ears: As Per HPI.  absent: Ear Pain, Dizziness


Nose/Mouth/Throat: As Per HPI.  absent: Epistaxis, Nasal Obstruction, Nasal 

Trauma





- Cardiovascular


Cardiovascular: As Per HPI.  absent: Acrocyanosis, Chest Pain, Chest Pain at 

Rest, Claudication, Dyspnea on Exertion





- Respiratory


Respiratory: As Per HPI.  absent: Cough, Dyspnea, Hemoptysis, Wheezing





- Gastrointestinal


Gastrointestinal: As Per HPI, Abdominal Pain, Heartburn, Nausea, Vomiting.  

absent: Change in Stool Character, Constipation, Diarrhea, Hematemesis, Hematoch

ezia





- Genitourinary


Genitourinary: As Per HPI.  absent: Dysuria, Urinary Frequency, Freq UTI





- Reproductive: Male


Reproductive:Male: As Per HPI





- Musculoskeletal


Musculoskeletal: As Per HPI.  absent: Joint Swelling, Muscle Weakness, Neck 

Pain, Numbness





- Integumentary


Integumentary: As Per HPI.  absent: Acne, Alopecia, Dry Skin, Furuncle





- Neurological


Neurological: As Per HPI.  absent: Behavioral Changes, Dizziness, Numbness, 

Tingling, Tremor, Vertigo





- Psychiatric


Psychiatric: As Per HPI.  absent: Anxiety, Depression





- Endocrine


Endocrine: As Per HPI





- Hematologic/Lymphatic


Hematologic: As Per HPI





Past Patient History





- Past Medical History & Family History


Past Medical History?: Yes





- Past Social History


Smoking Status: Never Smoked





- CARDIAC


Hx Cardiac Disorders: No





- PULMONARY


Hx Respiratory Disorders: No





- NEUROLOGICAL


Hx Dementia: No





- ENDOCRINE/METABOLIC


Hx Endocrine Disorders: Yes


Hx Diabetes Mellitus Type 2: Yes





- PSYCHIATRIC


Hx Substance Use: No





- SURGICAL HISTORY


Hx Surgeries: No





- ANESTHESIA


Hx Anesthesia: No





Meds


Allergies/Adverse Reactions: 


                                    Allergies











Allergy/AdvReac Type Severity Reaction Status Date / Time


 


No Known Allergies Allergy   Verified 04/22/19 07:35














Physical Exam





- Constitutional


Appears: Well, No Acute Distress





- Head Exam


Head Exam: ATRAUMATIC, NORMOCEPHALIC





- Eye Exam


Eye Exam: EOMI, Normal appearance, PERRL


Pupil Exam: NORMAL ACCOMODATION, PERRL





- ENT Exam


ENT Exam: Mucous Membranes Moist, Normal Exam





- Neck Exam


Neck exam: Positive for: Normal Inspection





- Respiratory Exam


Respiratory Exam: Clear to Auscultation Bilateral, NORMAL BREATHING PATTERN.  

absent: Rhonchi, Wheezes, Respiratory Distress





- Cardiovascular Exam


Cardiovascular Exam: REGULAR RHYTHM, +S1, +S2





- GI/Abdominal Exam


GI & Abdominal Exam: Normal Bowel Sounds, Soft, Tenderness (epigatric tenderness

with deep palpation).  absent: Guarding, Hernia, Rigid


Additional comments: 





Negative Schmid's sign, Negative McBurney's





- Extremities Exam


Extremities exam: Positive for: normal inspection





- Neurological Exam


Neurological exam: Alert, CN II-XII Intact, Oriented x3





- Psychiatric Exam


Psychiatric exam: Normal Affect, Normal Mood





Results





- Vital Signs


Recent Vital Signs: 





                                Last Vital Signs











Temp  97.8 F   04/22/19 17:08


 


Pulse  68   04/22/19 17:08


 


Resp  18   04/22/19 17:08


 


BP  151/92 H  04/22/19 17:08


 


Pulse Ox  96   04/22/19 18:36














- Labs


Result Diagrams: 


                                 04/22/19 08:26





                                 04/22/19 08:26


Labs: 





                         Laboratory Results - last 24 hr











  04/22/19 04/22/19 04/22/19





  08:26 08:26 08:26


 


WBC  6.8  


 


RBC  3.94 L  


 


Hgb  13.3  


 


Hct  39.7  


 


MCV  100.8 H  


 


MCH  33.7 H  


 


MCHC  33.4  


 


RDW  12.3  


 


Plt Count  231  


 


MPV  8.9  


 


Neut % (Auto)  66.3  


 


Lymph % (Auto)  25.4  


 


Mono % (Auto)  6.7  


 


Eos % (Auto)  1.1  


 


Baso % (Auto)  0.5  


 


Neut # (Auto)  4.5  


 


Lymph # (Auto)  1.7  


 


Mono # (Auto)  0.5  


 


Eos # (Auto)  0.1  


 


Baso # (Auto)  0.0  


 


PT   12.4 H 


 


INR   1.1 


 


APTT   33 


 


pO2   


 


VBG pH   


 


VBG pCO2   


 


VBG HCO3   


 


VBG Total CO2   


 


VBG O2 Sat (Calc)   


 


VBG Base Excess   


 


VBG Potassium   


 


Glucose   


 


Lactate   


 


Sodium    133


 


Potassium    4.5


 


Chloride    99


 


Carbon Dioxide    28


 


Anion Gap    11


 


BUN    10


 


Creatinine    1.1


 


Est GFR ( Amer)    > 60


 


Est GFR (Non-Af Amer)    > 60


 


Random Glucose    208 H


 


Calcium    9.3


 


Total Bilirubin    0.6


 


AST    27


 


ALT    27


 


Alkaline Phosphatase    68


 


Total Protein    6.4


 


Albumin    3.9


 


Globulin    2.5


 


Albumin/Globulin Ratio    1.6


 


Lipase    125


 


Venous Blood Potassium   


 


Urine Color   


 


Urine Clarity   


 


Urine pH   


 


Ur Specific Gravity   


 


Urine Protein   


 


Urine Glucose (UA)   


 


Urine Ketones   


 


Urine Blood   


 


Urine Nitrate   


 


Urine Bilirubin   


 


Urine Urobilinogen   


 


Ur Leukocyte Esterase   


 


Urine WBC (Auto)   


 


Urine RBC (Auto)   














  04/22/19 04/22/19





  08:34 08:43


 


WBC  


 


RBC  


 


Hgb  


 


Hct  


 


MCV  


 


MCH  


 


MCHC  


 


RDW  


 


Plt Count  


 


MPV  


 


Neut % (Auto)  


 


Lymph % (Auto)  


 


Mono % (Auto)  


 


Eos % (Auto)  


 


Baso % (Auto)  


 


Neut # (Auto)  


 


Lymph # (Auto)  


 


Mono # (Auto)  


 


Eos # (Auto)  


 


Baso # (Auto)  


 


PT  


 


INR  


 


APTT  


 


pO2  30 


 


VBG pH  7.38 


 


VBG pCO2  50 


 


VBG HCO3  26.4 


 


VBG Total CO2  31.1 H 


 


VBG O2 Sat (Calc)  57.1 


 


VBG Base Excess  3.4 H 


 


VBG Potassium  4.1 


 


Glucose  192 H 


 


Lactate  0.9 


 


Sodium  134.0 


 


Potassium  


 


Chloride  100.0 


 


Carbon Dioxide  


 


Anion Gap  


 


BUN  


 


Creatinine  


 


Est GFR ( Amer)  


 


Est GFR (Non-Af Amer)  


 


Random Glucose  


 


Calcium  


 


Total Bilirubin  


 


AST  


 


ALT  


 


Alkaline Phosphatase  


 


Total Protein  


 


Albumin  


 


Globulin  


 


Albumin/Globulin Ratio  


 


Lipase  


 


Venous Blood Potassium  4.1 


 


Urine Color   Yellow


 


Urine Clarity   Clear


 


Urine pH   6.0


 


Ur Specific Gravity   1.016


 


Urine Protein   Negative


 


Urine Glucose (UA)   1+ H


 


Urine Ketones   Negative


 


Urine Blood   Negative


 


Urine Nitrate   Negative


 


Urine Bilirubin   Negative


 


Urine Urobilinogen   Normal


 


Ur Leukocyte Esterase   Neg


 


Urine WBC (Auto)   2


 


Urine RBC (Auto)   < 1














Assessment & Plan





- Assessment and Plan (Free Text)


Assessment: 





Epigastric abdominal pain


-Abdominal ultrasound shows cholelithiasis, fatty liver.


-Abdominal CT shows: Hypoattenuation of the liver compatible with hepatic 

steatosis. Enlarged heterogeneous prostate gland. Recommend correlation with 

PSA. Diverticulosis without CT evidence of acute diverticulitis. Moderate 

diffuse constipation. 7 mm nonobstructing right lower pole renal calculus. 

Bilateral renal cortical scarring/lobulations


-Full liquid diet, advance as tolerated


-Follow up ELMA and EKG


-IVF NS 100ml/hr


-Protonix 40mg


-Zofran 4mg IV PRN





Diabetes


-ISS


-Hypoglycemic protocol


-CCD


-Accucheck ACHS


-Metformin 850mg BID


-F/u A1C





Prophylactic measures


-Protonix


-Lovenox





Case discussed with Dr. Garcia

## 2019-04-22 NOTE — C.PDOC
History Of Present Illness


75 y/o male presents to the ED for 3 week history of upper abdominal pain. 

Patient was recently admitted from 4/16-4/19. States he came in for the 

abdominal pain, had a cardiac work-up which was negative, and he was discharged 

home. He reports pain is worsening, associated with nausea and vomiting. Patient

had multiple episodes of vomiting last night. No diarrhea. Pain was initially 

mild, gradual onset 3 weeks ago, and progressively worsening since. Otherwise 

patient denies any chest pain, SOB, fever, chills, headaches, dizziness, or 

urinary complaints.





Time Seen by Provider: 04/22/19 07:41


Chief Complaint (Nursing): Abdominal Pain


History Per: Patient


History/Exam Limitations: no limitations


Onset/Duration Of Symptoms: Days


Current Symptoms Are (Timing): Still Present


Location Of Pain/Discomfort: RUQ


Associated Symptoms: Nausea, Vomiting





Past Medical History


Reviewed: Historical Data, Nursing Documentation, Vital Signs


Vital Signs: 





                                Last Vital Signs











Temp  98.8 F   04/22/19 07:33


 


Pulse  70   04/22/19 07:33


 


Resp  17   04/22/19 07:33


 


BP  144/91 H  04/22/19 07:33


 


Pulse Ox  96   04/22/19 07:33














- Medical History


PMH: Diabetes (type 2)


   Denies: Dementia


Family History: States: Unknown Family Hx





- Social History


Hx Tobacco Use: No


Hx Alcohol Use: No


Hx Substance Use: No





- Immunization History


Hx Tetanus Toxoid Vaccination: Yes


Hx Influenza Vaccination: No


Hx Pneumococcal Vaccination: No





Review Of Systems


Constitutional: Negative for: Fever, Chills


Cardiovascular: Negative for: Chest Pain, Palpitations


Respiratory: Negative for: Shortness of Breath


Gastrointestinal: Positive for: Nausea, Vomiting, Abdominal Pain.  Negative for:

Diarrhea, Constipation


Genitourinary: Negative for: Dysuria, Hematuria


Neurological: Negative for: Weakness, Dizziness





Physical Exam





- Physical Exam


Appears: Non-toxic, No Acute Distress, Other (Appears uncomfortable)


Skin: Warm, Dry, No Rash


Head: Atraumatic, Normacephalic


Eye(s): bilateral: Normal Inspection, PERRL, EOMI


Oral Mucosa: Moist


Neck: Normal ROM


Chest: Symmetrical


Cardiovascular: Rhythm Regular, No Murmur


Respiratory: Normal Breath Sounds, No Accessory Muscle Use


Gastrointestinal/Abdominal: Soft, Tenderness (to the RUQ), Guarding (RUQ), No 

Rebound


Extremity: Bilateral: Atraumatic, Normal Color And Temperature


Neurological/Psych: Oriented x3, Normal Speech





ED Course And Treatment





- Laboratory Results


Result Diagrams: 


                                 04/22/19 08:26





                                 04/22/19 08:26


Lab Results: 





                                        











pO2  30 mm/Hg (30-55)   04/22/19  08:34    


 


VBG pH  7.38  (7.32-7.43)   04/22/19  08:34    


 


VBG pCO2  50 mmHg (40-60)   04/22/19  08:34    


 


VBG HCO3  26.4 mmol/L  04/22/19  08:34    


 


VBG Total CO2  31.1 mmol/L (22-28)  H  04/22/19  08:34    


 


VBG O2 Sat (Calc)  57.1 % (40-65)   04/22/19  08:34    


 


VBG Base Excess  3.4 mmol/L (0.0-2.0)  H  04/22/19  08:34    


 


VBG Potassium  4.1 mmol/L (3.6-5.2)   04/22/19  08:34    


 


Sodium  134.0 mmol/l (132-148)   04/22/19  08:34    


 


Chloride  100.0 mmol/L ()   04/22/19  08:34    


 


Glucose  192 mg/dl ()  H  04/22/19  08:34    


 


Lactate  0.9 mmol/L (0.7-2.1)   04/22/19  08:34    








                                        











PT  12.4 SECONDS (9.7-12.2)  H  04/22/19  08:26    


 


INR  1.1   04/22/19  08:26    


 


APTT  33 SECONDS (21-34)   04/22/19  08:26    











O2 Sat by Pulse Oximetry: 96 (on RA)


Pulse Ox Interpretation: Normal





- CT Scan/US


  ** Abdominal US


Other Rad Studies (CT/US): Read By Radiologist, Radiology Report Reviewed


CT/US Interpretation: Accession No. : [id number].  Patient Name / ID : 

OMAIRA MCINTYRE  / [id number].  Exam Date : 04/22/2019 11:07:19 ( Approved ).  

Study Comment :  Sex / Age : M  / 74Y.  Creator : Thao Rivera MD.  

Dictator : Thao Rivera MD.   :  Approver : Thao Rivera MD.  Approver2 :  Report Date : 04/22/2019 11:32:22.  My Comment :  

*******************************************************************************

****.  Date of service:  04/22/2019.  HISTORY:  Upper abdominal pain.  

COMPARISON:  01/25/2018.  TECHNIQUE:  Sonographic evaluation of the right upper 

quadrant of the abdomen.  FINDINGS:  LIVER:  Measures 15.1 cm in length. There 

is diffuse increased echogenicity of the liver parenchyma.  No mass. No 

intrahepatic bile duct dilatation.  GALLBLADDER:  From multiple prior scratched 

fluid fibroid is distended without wall thickening or pericholecystic fluid.  

The sonographic Schmid's sign is.  COMMON BILE DUCT:  Measures 7.0 mm.  No 

stones. No dilatation.  PANCREAS:  Unremarkable as visualized. No mass. No 

ductal dilatation.  RIGHT KIDNEY:  Measures 9.8 cm in length. Normal 

echogenicity.  No calculus, mass, or hydronephrosis.  AORTA:  No aneurysmal 

dilatation.  IVC:  Unremarkable.  OTHER FINDINGS:  None .  IMPRESSION:  

Cholelithiasis.  Fatty liver.





  ** CT Abdomen/Pelvis


Other Rad Studies (CT/US): Read By Radiologist, Radiology Report Reviewed


CT/US Interpretation: Accession No. : [id number].  Patient Name / ID : 

OMAIRA MCINTYRE  / [id number].  Exam Date : 04/22/2019 14:29:17 ( Approved ).  

Study Comment :  Sex / Age : M  / 74Y.  Creator : Misti Gutiérrez MD.  

Dictator : Misti Gutiérrez MD.   :  Approver : Misti Gutiérrez MD.  Approver2 :  Report Date : 04/22/2019 15:52:11.  My Comment :  

*****************

******************************************************************.  PROCEDURE: 

CT Abdomen and Pelvis with oral and  IV contrast.  HISTORY:  abdominal pain, 

vomiting.  COMPARISON:  Limited abdominal ultrasound performed 4/22/19.  

TECHNIQUE:  Contiguous axial images of the abdomen and pelvis. Oral and IV 

contrast was administered. Coronal and Sagittal reformats generated and 

reviewed.  Contrast dose: 100 mL Visipaque 320 IV.  Radiation dose:  Total exam 

DLP = 435.71 mGy-cm.  This CT exam was performed using one or more of the 

following dose reduction techniques: Automated exposure control, adjustment of 

the mA and/or kV according to patient size, and/or use of iterative 

reconstruction technique.  FINDINGS:  LOWER THORAX:  No visible consolidation, 

pleural effusion, or pneumothorax.  LIVER:  Hypoattenuation of the liver 

compatible with hepatic steatosis.  GALLBLADDER AND BILE DUCTS:  Unremarkable.  

PANCREAS:  Unremarkable.  SPLEEN:  Unremarkable.  ADRENALS:  Unremarkable.  

KIDNEYS AND URETERS:  The kidneys enhance symmetrically. Bilateral cortical 

scarring/lobulations. No hydronephrosis or obstructing renal calculus. 

Nonobstructing 7 mm right lower pole calculus.  BLADDER:  The urinary bladder 

appears unremarkable.  REPRODUCTIVE:  The prostate gland measures approximately 

4.7 x 5.3 cm.  APPENDIX:  The appendix appears within normal limits of caliber. 

No secondary signs of acute appendicitis.  BOWEL:  The stomach is nondistended. 

The bowel loops appear within normal limits of caliber without evidence of 

intestinal obstruction. Diverticulosis without CT evidence of acute dive

rticulitis. Moderate diffuse constipation.  PERITONEUM:  No significant free 

fluid. No definite free air.  LYMPH NODES:  No bulky lymphadenopathy identified.

 VASCULATURE:  No aortic aneurysm. Minimal scattered atherosclerotic 

calcifications.  BONES:  Degenerative changes of the spine.  OTHER FINDINGS:  

Bilateral gynecomastia.  Small fat containing umbilical hernia.  IMPRESSION:  

Hypoattenuation of the liver compatible with hepatic steatosis.  Enlarged 

heterogeneous prostate gland. Recommend correlation with PSA.  Diverticulosis 

without CT evidence of acute diverticulitis. Moderate diffuse constipation.  7 

mm nonobstructing right lower pole renal calculus. Bilateral renal cortical 

scarring/lobulations.  Additional findings as above.


Progress Note: Blood work and urine sent to the lab for analysis. Patient given 

NS IV fluids and 40 mg IV protonix. Pending abdominal ultrasound [RUQ]. 

Ultrasound reviewed, showing cholelithiasis and fatty liver. Patient continues t

o complain of nausea. Given 4 mg IV zofran. Added on CT Abdomen/Pelvis with 

PO&IV contrast.  4:00pm  CT scan resulted. Imaging findings discussed w/ 

patient.  4:31pm  Paged Dr. Gracia, medicine on-call.





- Physician Consult Information


Time Consulting Physician Contacted: 17:05


Physician Contacted: Myles Garcia Jr.


Outcome Of Conversation: Reviewed case with Dr. Garcia, will admit to his service.

 Medical resident notified.





Disposition





- Disposition


Disposition: HOSPITALIZED


Disposition Time: 17:15


Condition: FAIR





- Clinical Impression


Clinical Impression: 


 Epigastric pain, Vomiting








- PA / NP / Resident Statement


MD/DO has reviewed & agrees with the documentation as recorded.





- Scribe Statement


The provider has reviewed the documentation as recorded by the Scribe


Estrella Eisenberg





All medical record entries made by the Scribe were at my direction and 

personally dictated by me. I have reviewed the chart and agree that the record 

accurately reflects my personal performance of the history, physical exam, 

medical decision making, and the department course for this patient. I have also

 personally directed, reviewed, and agree with the discharge instructions and 

disposition.





Decision To Admit





- Pt Status Changed To:


Hospital Disposition Of: Observation





- .


Bed Request Type: Regular


Admitting Physician: Myles Garcia Jr.


Patient Diagnosis: 


 Epigastric pain, Vomiting

## 2019-04-22 NOTE — US
Date of service: 



04/22/2019



HISTORY:

Upper abdominal pain 



COMPARISON:

01/25/2018.



TECHNIQUE:

Sonographic evaluation of the right upper quadrant of the abdomen.



FINDINGS:



LIVER:

Measures 15.1 cm in length. There is diffuse increased echogenicity 

of the liver parenchyma.  No mass. No intrahepatic bile duct 

dilatation. 



GALLBLADDER:

From multiple prior scratched fluid fibroid is distended without wall 

thickening or pericholecystic fluid.  The sonographic Schmid's sign 

is 



COMMON BILE DUCT:

Measures 7.0 mm.  No stones. No dilatation.



PANCREAS:

Unremarkable as visualized. No mass. No ductal dilatation.



RIGHT KIDNEY:

Measures 9.8 cm in length. Normal echogenicity.  No calculus, mass, 

or hydronephrosis.



AORTA:

No aneurysmal dilatation.



IVC:

Unremarkable.



OTHER FINDINGS:

None .



IMPRESSION:

Cholelithiasis.



Fatty liver.

## 2019-04-22 NOTE — CT
PROCEDURE:  CT Abdomen and Pelvis with oral and  IV contrast.



HISTORY:

abdominal pain, vomiting



COMPARISON:

Limited abdominal ultrasound performed 4/22/19



TECHNIQUE:

Contiguous axial images of the abdomen and pelvis. Oral and IV 

contrast was administered. Coronal and Sagittal reformats generated 

and reviewed. 



Contrast dose: 100 mL Visipaque 320 IV



Radiation dose:



Total exam DLP = 435.71 mGy-cm.



This CT exam was performed using one or more of the following dose 

reduction techniques: Automated exposure control, adjustment of the 

mA and/or kV according to patient size, and/or use of iterative 

reconstruction technique.



FINDINGS:





LOWER THORAX:

No visible consolidation, pleural effusion, or pneumothorax.



LIVER:

Hypoattenuation of the liver compatible with hepatic steatosis. 



GALLBLADDER AND BILE DUCTS:

Unremarkable. 



PANCREAS:

Unremarkable. 



SPLEEN:

Unremarkable. 



ADRENALS:

Unremarkable. 



KIDNEYS AND URETERS:

The kidneys enhance symmetrically. Bilateral cortical 

scarring/lobulations. No hydronephrosis or obstructing renal 

calculus. Nonobstructing 7 mm right lower pole calculus. 



BLADDER:

The urinary bladder appears unremarkable.



REPRODUCTIVE:

The prostate gland measures approximately 4.7 x 5.3 cm. 



APPENDIX:

The appendix appears within normal limits of caliber. No secondary 

signs of acute appendicitis.



BOWEL:

The stomach is nondistended. 



The bowel loops appear within normal limits of caliber without 

evidence of intestinal obstruction. Diverticulosis without CT 

evidence of acute diverticulitis. Moderate diffuse constipation. 



PERITONEUM:

No significant free fluid. No definite free air.



LYMPH NODES:

No bulky lymphadenopathy identified.



VASCULATURE:

No aortic aneurysm. Minimal scattered atherosclerotic calcifications.



BONES:

Degenerative changes of the spine.



OTHER FINDINGS:

Bilateral gynecomastia. 



Small fat containing umbilical hernia. 



IMPRESSION:

Hypoattenuation of the liver compatible with hepatic steatosis. 



Enlarged heterogeneous prostate gland. Recommend correlation with 

PSA. 



Diverticulosis without CT evidence of acute diverticulitis. Moderate 

diffuse constipation. 



7 mm nonobstructing right lower pole renal calculus. Bilateral renal 

cortical scarring/lobulations. 



Additional findings as above.

## 2019-04-23 VITALS — OXYGEN SATURATION: 96 % | DIASTOLIC BLOOD PRESSURE: 66 MMHG | SYSTOLIC BLOOD PRESSURE: 113 MMHG

## 2019-04-23 VITALS — HEART RATE: 60 BPM | RESPIRATION RATE: 20 BRPM

## 2019-04-23 LAB
ALBUMIN SERPL-MCNC: 3.5 G/DL (ref 3.5–5)
ALBUMIN/GLOB SERPL: 1.4 {RATIO} (ref 1–2.1)
ALT SERPL-CCNC: 25 U/L (ref 21–72)
AST SERPL-CCNC: 20 U/L (ref 17–59)
BASOPHILS # BLD AUTO: 0 K/UL (ref 0–0.2)
BASOPHILS NFR BLD: 0.7 % (ref 0–2)
BUN SERPL-MCNC: 9 MG/DL (ref 9–20)
CALCIUM SERPL-MCNC: 9 MG/DL (ref 8.6–10.4)
CK MB SERPL-MCNC: < 0.22 NG/ML (ref 0–3.38)
CK MB SERPL-MCNC: < 0.22 NG/ML (ref 0–3.38)
EOSINOPHIL # BLD AUTO: 0.2 K/UL (ref 0–0.7)
EOSINOPHIL NFR BLD: 3.3 % (ref 0–4)
ERYTHROCYTE [DISTWIDTH] IN BLOOD BY AUTOMATED COUNT: 12.3 % (ref 11.5–14.5)
GFR NON-AFRICAN AMERICAN: > 60
HGB BLD-MCNC: 12.7 G/DL (ref 12–18)
LYMPHOCYTES # BLD AUTO: 1.4 K/UL (ref 1–4.3)
LYMPHOCYTES NFR BLD AUTO: 29.2 % (ref 20–40)
MCH RBC QN AUTO: 34.1 PG (ref 27–31)
MCHC RBC AUTO-ENTMCNC: 33.9 G/DL (ref 33–37)
MCV RBC AUTO: 100.7 FL (ref 80–94)
MONOCYTES # BLD: 0.4 K/UL (ref 0–0.8)
MONOCYTES NFR BLD: 8.9 % (ref 0–10)
NEUTROPHILS # BLD: 2.8 K/UL (ref 1.8–7)
NEUTROPHILS NFR BLD AUTO: 57.9 % (ref 50–75)
NRBC BLD AUTO-RTO: 0.2 % (ref 0–2)
PLATELET # BLD: 218 K/UL (ref 130–400)
PMV BLD AUTO: 9.1 FL (ref 7.2–11.7)
RBC # BLD AUTO: 3.73 MIL/UL (ref 4.4–5.9)
WBC # BLD AUTO: 4.9 K/UL (ref 4.8–10.8)

## 2019-04-23 RX ADMIN — HUMAN INSULIN SCH UNITS: 100 INJECTION, SOLUTION SUBCUTANEOUS at 10:08

## 2019-04-23 RX ADMIN — HUMAN INSULIN SCH UNITS: 100 INJECTION, SOLUTION SUBCUTANEOUS at 12:27

## 2019-04-23 NOTE — CP.PCM.DIS
Provider





- Provider


Date of Admission: 


04/22/19 17:14





Attending physician: 


Myles Yap Jr, MD





Primary care physician: 


Dr. Yap 


Time Spent in preparation of Discharge (in minutes): 45





Diagnosis





- Discharge Diagnosis


(1) Nephrolithiasis


Status: Acute   Priority: Medium   





(2) Abdominal pain


Status: Acute   Priority: Medium   





(3) Epigastric pain


Status: Acute   Priority: Medium   





Hospital Course





- Lab Results


Lab Results: 


                             Most Recent Lab Values











WBC  4.9 K/uL (4.8-10.8)   04/23/19  06:40    


 


RBC  3.73 Mil/uL (4.40-5.90)  L  04/23/19  06:40    


 


Hgb  12.7 g/dL (12.0-18.0)   04/23/19  06:40    


 


Hct  37.6 % (35.0-51.0)   04/23/19  06:40    


 


MCV  100.7 fL (80.0-94.0)  H  04/23/19  06:40    


 


MCH  34.1 pg (27.0-31.0)  H  04/23/19  06:40    


 


MCHC  33.9 g/dL (33.0-37.0)   04/23/19  06:40    


 


RDW  12.3 % (11.5-14.5)   04/23/19  06:40    


 


Plt Count  218 K/uL (130-400)   04/23/19  06:40    


 


MPV  9.1 fL (7.2-11.7)   04/23/19  06:40    


 


Neut % (Auto)  57.9 % (50.0-75.0)   04/23/19  06:40    


 


Lymph % (Auto)  29.2 % (20.0-40.0)   04/23/19  06:40    


 


Mono % (Auto)  8.9 % (0.0-10.0)   04/23/19  06:40    


 


Eos % (Auto)  3.3 % (0.0-4.0)   04/23/19  06:40    


 


Baso % (Auto)  0.7 % (0.0-2.0)   04/23/19  06:40    


 


Neut # (Auto)  2.8 K/uL (1.8-7.0)   04/23/19  06:40    


 


Lymph # (Auto)  1.4 K/uL (1.0-4.3)   04/23/19  06:40    


 


Mono # (Auto)  0.4 K/uL (0.0-0.8)   04/23/19  06:40    


 


Eos # (Auto)  0.2 K/uL (0.0-0.7)   04/23/19  06:40    


 


Baso # (Auto)  0.0 K/uL (0.0-0.2)   04/23/19  06:40    


 


PT  12.4 SECONDS (9.7-12.2)  H  04/22/19  08:26    


 


INR  1.1   04/22/19  08:26    


 


APTT  33 SECONDS (21-34)   04/22/19  08:26    


 


pO2  30 mm/Hg (30-55)   04/22/19  08:34    


 


VBG pH  7.38  (7.32-7.43)   04/22/19  08:34    


 


VBG pCO2  50 mmHg (40-60)   04/22/19  08:34    


 


VBG HCO3  26.4 mmol/L  04/22/19  08:34    


 


VBG Total CO2  31.1 mmol/L (22-28)  H  04/22/19  08:34    


 


VBG O2 Sat (Calc)  57.1 % (40-65)   04/22/19  08:34    


 


VBG Base Excess  3.4 mmol/L (0.0-2.0)  H  04/22/19  08:34    


 


VBG Potassium  4.1 mmol/L (3.6-5.2)   04/22/19  08:34    


 


Sodium  134.0 mmol/l (132-148)   04/22/19  08:34    


 


Chloride  100.0 mmol/L ()   04/22/19  08:34    


 


Glucose  192 mg/dl ()  H  04/22/19  08:34    


 


Lactate  0.9 mmol/L (0.7-2.1)   04/22/19  08:34    


 


Sodium  136 mmol/L (132-148)   04/23/19  06:40    


 


Potassium  4.3 mmol/L (3.6-5.2)   04/23/19  06:40    


 


Chloride  102 mmol/L ()   04/23/19  06:40    


 


Carbon Dioxide  28 mmol/L (22-30)   04/23/19  06:40    


 


Anion Gap  10  (10-20)   04/23/19  06:40    


 


BUN  9 mg/dL (9-20)   04/23/19  06:40    


 


Creatinine  1.0 mg/dL (0.8-1.5)   04/23/19  06:40    


 


Est GFR ( Amer)  > 60   04/23/19  06:40    


 


Est GFR (Non-Af Amer)  > 60   04/23/19  06:40    


 


POC Glucose (mg/dL)  209 mg/dL ()  H  04/23/19  11:06    


 


Random Glucose  182 mg/dL ()  H  04/23/19  06:40    


 


Hemoglobin A1c  9.0 % (4.2-6.5)  H  04/22/19  20:08    


 


Calcium  9.0 mg/dl (8.6-10.4)   04/23/19  06:40    


 


Phosphorus  3.4 mg/dL (2.5-4.5)   04/23/19  06:40    


 


Magnesium  1.7 mg/dL (1.6-2.3)   04/23/19  06:40    


 


Total Bilirubin  0.6 mg/dL (0.2-1.3)   04/23/19  06:40    


 


AST  20 U/L (17-59)   04/23/19  06:40    


 


ALT  25 U/L (21-72)   04/23/19  06:40    


 


Alkaline Phosphatase  58 U/L ()   04/23/19  06:40    


 


Total Creatine Kinase  39 U/L ()  L  04/23/19  06:40    


 


CK-MB (Mass)  < 0.22 ng/mL (0.0-3.38)   04/23/19  06:40    


 


Troponin I  < 0.0120 ng/mL (0.00-0.120)   04/23/19  06:40    


 


Total Protein  5.9 g/dL (6.3-8.3)  L  04/23/19  06:40    


 


Albumin  3.5 g/dL (3.5-5.0)   04/23/19  06:40    


 


Globulin  2.4 gm/dL (2.2-3.9)   04/23/19  06:40    


 


Albumin/Globulin Ratio  1.4  (1.0-2.1)   04/23/19  06:40    


 


Triglycerides  67 mg/dL (0-149)  D 04/22/19  20:08    


 


Cholesterol  129 mg/dL (0-199)   04/22/19  20:08    


 


LDL Cholesterol Direct  85 mg/dL (0-129)   04/22/19  20:08    


 


HDL Cholesterol  44 mg/dL (30-70)   04/22/19  20:08    


 


Lipase  125 U/L ()   04/22/19  08:26    


 


Venous Blood Potassium  4.1 mmol/L (3.6-5.2)   04/22/19  08:34    


 


Urine Color  Yellow  (YELLOW)   04/22/19  08:43    


 


Urine Clarity  Clear  (Clear)   04/22/19  08:43    


 


Urine pH  6.0  (5.0-8.0)   04/22/19  08:43    


 


Ur Specific Gravity  1.016  (1.003-1.030)   04/22/19  08:43    


 


Urine Protein  Negative mg/dL (NEGATIVE)   04/22/19  08:43    


 


Urine Glucose (UA)  1+ mg/dL (Normal)  H  04/22/19  08:43    


 


Urine Ketones  Negative mg/dL (NEGATIVE)   04/22/19  08:43    


 


Urine Blood  Negative  (NEGATIVE)   04/22/19  08:43    


 


Urine Nitrate  Negative  (NEGATIVE)   04/22/19  08:43    


 


Urine Bilirubin  Negative  (NEGATIVE)   04/22/19  08:43    


 


Urine Urobilinogen  Normal mg/dL (0.2-1.0)   04/22/19  08:43    


 


Ur Leukocyte Esterase  Neg Stanley/uL (Negative)   04/22/19  08:43    


 


Urine WBC (Auto)  2 /hpf (0-5)   04/22/19  08:43    


 


Urine RBC (Auto)  < 1 /hpf (0-3)   04/22/19  08:43    














- Hospital Course


Hospital Course: 


PGY1 Medicine Discharge Summary and Hospital Course for Dr. Yap  


74 year old [place] [employer] party male with history of type 2 diabetes presents 

to the ED today complaining of epigastric abdominal pain. Patient reports his 

abdominal pain started about 10 days ago. He describes the pain as burning in 

sensation, non radiating, and rating it 7/10 at the worst. He states the pain is

worse with food intake and improves with rest. He also reports to have 

associated nausea and vomiting. No prior history of the same. Patient was 

diagnosed with diabetes 10 years ago in  and was told to take Meformin 850mg 

twice daily. Please see chart for more detail and a more complete summary. 





During hospitalization 


Patient was subsequently admitted for epigastric abdominal pain. ABD ultrasound 

revealed cholelithiasis, fatty liver. CT ABD/Pelvis revealed hypoattenuation of 

the liver compatible with hepatic steatosis. Enlarged heterogeneous prostate 

gland. Recommend correlation with PSA. Diverticulosis without CT evidence of 

acute diverticulitis. Moderate diffuse constipation. 7 mm nonobstructing right 

lower pole renal calculus. Bilateral renal cortical scarring/lobulations. Serial

troponin was negative. IVF was administered at 100cc/hr. Patient was treated 

with protonix and zofran, and full liquid diet was started. Diet was advanced as

tolerated. Patient also has known diabetes mellitus type 2. Patient was started 

insulin sliding scale and home medication. Hypoglycemic protocol was initiated, 

and Hemoglobin A1C was resulted at 9%. Please see chart for details. 





On day of Discharge


Patient was no longer having abdominal / epigastric pain. Patient had no acute 

events overnight, and Patient had no new complaints. Patient's condition 

improved clinically, and Patient was medically stable and clinically optimized 

for discharge to home. Patient was instructed to follow-up with Dr. Yap's 

office within 3-5 days of being discharged from the hospital. Patient was 

encouraged to significantly increase PO water intake due to renal calculi. 

Please see chart for details. 





Discharge Instructions


Were provided to the Patient both in English and in Tongan, in both writing and

verbally, to the level of the Patient's comprehension. Patient both understands 

and agrees to all instructions. Please see below. 





Please follow-up with Dr. Yap in his office within 3-5 days of being discharged

from the hospital. 





CT of abdomen/pelvis revealed that you have a small kidney stone (7mm renal 

calculus); to help this pass, it is recommended that you drastically increase 

your water intake. 





Continue all home medications as prescribed. 





If your symptoms return, go to your nearest emergency department immediately. 





Discussed with Dr. Jose Blanco PGY1  





Discharge Exam





- Head Exam


Head Exam: ATRAUMATIC, NORMOCEPHALIC





- Eye Exam


Eye Exam: EOMI, Normal appearance, PERRL


Pupil Exam: NORMAL ACCOMODATION





- ENT Exam


ENT Exam: Mucous Membranes Moist, Normal Exam





- Neck Exam


Neck exam: Full Rom, Normal Inspection





- Respiratory Exam


Respiratory Exam: NORMAL BREATHING PATTERN, UNREMARKABLE.  absent: Decreased 

Breath Sounds, Rales, Rhonchi, Wheezes





- Cardiovascular Exam


Cardiovascular Exam: REGULAR RHYTHM, +S1, +S2.  absent: Tachycardia, Diastolic 

murmur, JVD





- GI/Abdominal Exam


GI & Abdominal Exam: Normal Bowel Sounds, Soft, Unremarkable.  absent: 

Tenderness





- Extremities Exam


Extremities exam: full ROM, normal capillary refill, pedal pulses present





- Back Exam


Back exam: FULL ROM, NORMAL INSPECTION.  absent: rash noted, tenderness





- Neurological Exam


Neurological exam: Alert, Oriented x3





- Psychiatric Exam


Psychiatric exam: Normal Affect, Normal Mood





- Skin


Skin Exam: Dry, Intact, Normal Color, Warm





Discharge Plan





- Follow Up Plan


Condition: GOOD


Disposition: HOME/ ROUTINE


Instructions:  Dyspepsia (DC), Nausea and Vomiting, Adult (DC), Stomach Ache and

Stomach Upset


Additional Instructions: 


Please follow-up with Dr. Yap in his office within 3-5 days of being discharged

from the hospital. 





CT of abdomen/pelvis revealed that you have a small kidney stone (7mm renal 

calculus); to help this pass, it is recommended that you drastically increase 

your water intake. 





Continue all home medications as prescribed. 





If your symptoms return, go to your nearest emergency department immediately. 





Tongan Translation: 





Por favor, chin un seguimiento con el Dr. yap en [place] [facility] dentro de los 3-5 

ivy de lu[place] sido dado de rema del hospital. 





La tomografa computarizada del abdomen/pelvis revel que usted tiene arvin 

pequea denis en el rin (clculo renal de 7 mm); para ayudar a sandra paso, se 

recomienda que aumente drsticamente la ingesta de agua. 





Contine todos los medicamentos caseros segn lo prescrito. 





Si los sntomas regresan, vaya al Departamento de emergencias [facility] 

inmediatamente. 


Referrals: 


Myles Yap Jr., MD [Medical Doctor] -